# Patient Record
Sex: MALE | Race: WHITE | NOT HISPANIC OR LATINO | Employment: FULL TIME | URBAN - METROPOLITAN AREA
[De-identification: names, ages, dates, MRNs, and addresses within clinical notes are randomized per-mention and may not be internally consistent; named-entity substitution may affect disease eponyms.]

---

## 2024-09-25 ENCOUNTER — OFFICE VISIT (OUTPATIENT)
Dept: UROLOGY | Facility: CLINIC | Age: 64
End: 2024-09-25
Payer: COMMERCIAL

## 2024-09-25 VITALS
BODY MASS INDEX: 28.15 KG/M2 | DIASTOLIC BLOOD PRESSURE: 75 MMHG | HEART RATE: 102 BPM | WEIGHT: 190.06 LBS | SYSTOLIC BLOOD PRESSURE: 108 MMHG | TEMPERATURE: 98 F | HEIGHT: 69 IN | RESPIRATION RATE: 17 BRPM

## 2024-09-25 DIAGNOSIS — N40.1 BPH WITH OBSTRUCTION/LOWER URINARY TRACT SYMPTOMS: Primary | ICD-10-CM

## 2024-09-25 DIAGNOSIS — R39.9 LOWER URINARY TRACT SYMPTOMS (LUTS): ICD-10-CM

## 2024-09-25 DIAGNOSIS — N20.0 RENAL STONES: ICD-10-CM

## 2024-09-25 DIAGNOSIS — N13.8 BPH WITH OBSTRUCTION/LOWER URINARY TRACT SYMPTOMS: Primary | ICD-10-CM

## 2024-09-25 LAB
BILIRUB UR QL STRIP: NEGATIVE
GLUCOSE UR QL STRIP: NEGATIVE
KETONES UR QL STRIP: NEGATIVE
LEUKOCYTE ESTERASE UR QL STRIP: NEGATIVE
PH, POC UA: 6
POC BLOOD, URINE: NEGATIVE
POC NITRATES, URINE: NEGATIVE
POC RESIDUAL URINE VOLUME: 43 ML (ref 0–100)
PROT UR QL STRIP: NEGATIVE
SP GR UR STRIP: 1 (ref 1–1.03)
UROBILINOGEN UR STRIP-ACNC: 0.2 (ref 0.3–2.2)

## 2024-09-25 PROCEDURE — 4010F ACE/ARB THERAPY RXD/TAKEN: CPT | Mod: CPTII,S$GLB,, | Performed by: UROLOGY

## 2024-09-25 PROCEDURE — 99204 OFFICE O/P NEW MOD 45 MIN: CPT | Mod: S$GLB,,, | Performed by: UROLOGY

## 2024-09-25 PROCEDURE — 51798 US URINE CAPACITY MEASURE: CPT | Mod: S$GLB,,, | Performed by: UROLOGY

## 2024-09-25 PROCEDURE — 81003 URINALYSIS AUTO W/O SCOPE: CPT | Mod: QW,S$GLB,, | Performed by: UROLOGY

## 2024-09-25 PROCEDURE — 99999 PR PBB SHADOW E&M-NEW PATIENT-LVL IV: CPT | Mod: PBBFAC,,, | Performed by: UROLOGY

## 2024-09-25 PROCEDURE — 1159F MED LIST DOCD IN RCRD: CPT | Mod: CPTII,S$GLB,, | Performed by: UROLOGY

## 2024-09-25 PROCEDURE — 3074F SYST BP LT 130 MM HG: CPT | Mod: CPTII,S$GLB,, | Performed by: UROLOGY

## 2024-09-25 PROCEDURE — 3008F BODY MASS INDEX DOCD: CPT | Mod: CPTII,S$GLB,, | Performed by: UROLOGY

## 2024-09-25 PROCEDURE — 3078F DIAST BP <80 MM HG: CPT | Mod: CPTII,S$GLB,, | Performed by: UROLOGY

## 2024-09-25 RX ORDER — ROSUVASTATIN CALCIUM 5 MG/1
1 TABLET, COATED ORAL DAILY
COMMUNITY
Start: 2024-08-13

## 2024-09-25 RX ORDER — AMLODIPINE BESYLATE 5 MG/1
1 TABLET ORAL DAILY
COMMUNITY
Start: 2024-02-15

## 2024-09-25 RX ORDER — TAMSULOSIN HYDROCHLORIDE 0.4 MG/1
0.4 CAPSULE ORAL DAILY
Qty: 30 CAPSULE | Refills: 11 | Status: SHIPPED | OUTPATIENT
Start: 2024-09-25 | End: 2025-09-25

## 2024-09-25 RX ORDER — EZETIMIBE 10 MG/1
1 TABLET ORAL EVERY MORNING
COMMUNITY
Start: 2024-03-05 | End: 2025-08-22

## 2024-09-25 RX ORDER — OLMESARTAN MEDOXOMIL 20 MG/1
1 TABLET ORAL 2 TIMES DAILY
COMMUNITY
Start: 2024-01-30

## 2024-09-25 NOTE — PROGRESS NOTES
Chief Complaint   Patient presents with    [facility]        History of Present Illness:   Franki Parrish is a 63 y.o. male here for evaluation of [facility]    9/25/24-62yo male, here for prostate check. I have seen the pt previously, last in 2018 at [facility] for urolithiasis. Pt reports last stone episode was about 1.5 years ago. Pt having some LUTS. +weak stream, +intermittency, nocturia x 2. Ongoing for a couple years, but gets better at times and then comes back. No gross hematuria or dysuria. IPSS 23 QoL 3.       Review of Systems   Constitutional:  Negative for chills and fever.   Respiratory:  Negative for shortness of breath.    Cardiovascular:  Negative for chest pain.   Genitourinary:  Negative for hematuria.   Musculoskeletal:  Positive for back pain and myalgias. Negative for gait problem.   Neurological:  Negative for weakness.   All other systems reviewed and are negative.      Past Medical History:   Diagnosis Date    High cholesterol     Hypertension        Past Surgical History:   Procedure Laterality Date    HEMORRHOID SURGERY  03/2024    HERNIA REPAIR Bilateral        Family History   Problem Relation Name Age of Onset    Benign prostatic hyperplasia Father      Benign prostatic hyperplasia Brother      Prostate cancer Neg Hx         Social History     Tobacco Use    Smoking status: Never    Smokeless tobacco: Never   Substance Use Topics    Alcohol use: Yes     Alcohol/week: 2.0 standard drinks of alcohol     Types: 2 Cans of beer per week    Drug use: Never       Current Outpatient Medications   Medication Sig Dispense Refill    amLODIPine (NORVASC) 5 MG tablet Take 1 tablet by mouth once daily.      ezetimibe (ZETIA) 10 mg tablet Take 1 tablet by mouth every morning.      olmesartan (BENICAR) 20 MG tablet Take 1 tablet by mouth 2 (two) times daily.      rosuvastatin (CRESTOR) 5 MG tablet Take 1 tablet by mouth once daily.      tamsulosin (FLOMAX) 0.4 mg Cap Take 1 capsule (0.4 mg total) by  mouth once daily. 30 capsule 11     No current facility-administered medications for this visit.       Review of patient's allergies indicates:   Allergen Reactions    Keflex [cephalexin]        Physical Exam  Vitals:    09/25/24 1147   BP: 108/75   Pulse: 102   Resp: 17   Temp: 97.6 °F (36.4 °C)       General: Well-developed, well-nourished in no acute distress  HEENT: Normocephalic, atraumatic, Extraocular movements intact  Neck: supple, trachea midline, no cervical or supraclavicular lymphadenopathy  Respirations: even and unlabored  Back: midline spine, no CVA tenderness  Abdomen: soft, Non-tender, non-distended, no organomegaly or palpable masses, no rebound or guarding  : circumcised male phallus without lesions, orthotopic urethral meatus, no inguinal hernia, no inguinal lymphadenopathy, no scrotal lesions, testicles descended bilaterally without mass or tenderness  Rectal: 30g prostate, no nodules or tenderness. No gross blood  Extremities: atraumatic, moves all equally, no clubbing, cyanosis or edema  Psych: normal affect  Skin: warm and dry, no lesions  Neuro: Alert and oriented, Cranial nerves II-XII grossly intact    PVR: 43mL    Urinalysis  Negative for blood, LE, nit    PSA  9/27/24: 0.7    Assessment:   1. BPH with obstruction/lower urinary tract symptoms        2. Lower urinary tract symptoms (LUTS)  POCT Urinalysis, Dipstick, Automated, W/O Scope    POCT Bladder Scan      3. Renal stones  X-Ray Abdomen AP 1 View          Plan:  BPH with obstruction/lower urinary tract symptoms    Lower urinary tract symptoms (LUTS)  -     POCT Urinalysis, Dipstick, Automated, W/O Scope  -     POCT Bladder Scan    Renal stones  -     X-Ray Abdomen AP 1 View; Future; Expected date: 09/25/2024    Other orders  -     tamsulosin (FLOMAX) 0.4 mg Cap; Take 1 capsule (0.4 mg total) by mouth once daily.  Dispense: 30 capsule; Refill: 11    Discussed management of BPH with observation, pharmacologic therapy or minimally  invasive surgical techniques. Pt is interested in possibly pursuing UroLift should he not tolerate flomax well.     Follow up in about 4 weeks (around 10/23/2024) for possible Cystoscopy.

## 2024-10-04 ENCOUNTER — TELEPHONE (OUTPATIENT)
Dept: UROLOGY | Facility: CLINIC | Age: 64
End: 2024-10-04
Payer: COMMERCIAL

## 2024-10-04 RX ORDER — TAMSULOSIN HYDROCHLORIDE 0.4 MG/1
0.4 CAPSULE ORAL DAILY
Qty: 90 CAPSULE | Refills: 3 | Status: SHIPPED | OUTPATIENT
Start: 2024-10-04 | End: 2025-10-04

## 2024-10-04 NOTE — TELEPHONE ENCOUNTER
----- Message from MARY Martinez sent at 10/3/2024  2:47 PM CDT -----  Please advise.  ----- Message -----  From: Digna Garcia  Sent: 10/3/2024   1:01 PM CDT  To: [facility] Staff    Name of Who is Calling:ALIX ESPANA [[id number]]        What is the request in detail:Pt called in requesting pharmacy change for the following medication tamsulosin (FLOMAX) 0.4 mg Cap please send to the pharmacy below for 90days supply.Please advise thank you     [facility] - [address] [address] [facility]  [place] [id number]  Phone: [phone] Fax: [phone]        Can the clinic reply by [facility]:NO         What Number to Call Back if not in [facility]:Telephone Information:  Mobile          [phone]